# Patient Record
Sex: FEMALE | Race: OTHER | HISPANIC OR LATINO
[De-identification: names, ages, dates, MRNs, and addresses within clinical notes are randomized per-mention and may not be internally consistent; named-entity substitution may affect disease eponyms.]

---

## 2022-06-16 ENCOUNTER — APPOINTMENT (OUTPATIENT)
Dept: OBGYN | Facility: CLINIC | Age: 32
End: 2022-06-16

## 2022-06-16 ENCOUNTER — APPOINTMENT (OUTPATIENT)
Dept: ANTEPARTUM | Facility: CLINIC | Age: 32
End: 2022-06-16

## 2022-06-16 VITALS
SYSTOLIC BLOOD PRESSURE: 130 MMHG | DIASTOLIC BLOOD PRESSURE: 77 MMHG | WEIGHT: 183 LBS | HEIGHT: 63 IN | BODY MASS INDEX: 32.43 KG/M2

## 2022-06-16 PROCEDURE — ZZZZZ: CPT

## 2022-06-16 NOTE — HISTORY OF PRESENT ILLNESS
[FreeTextEntry1] : Patient is a 31 year old who presents as transfer of care at 28 weeks pregnant. She states she didn’t like the way she was being treated at [facility]. She was also told that she has a low lying placenta, she got MRI for this. On unofficial sono done today no placenta previa was noted. \par \par OBHx: C/S x4

## 2022-06-28 ENCOUNTER — LABORATORY RESULT (OUTPATIENT)
Age: 32
End: 2022-06-28

## 2022-06-28 ENCOUNTER — APPOINTMENT (OUTPATIENT)
Dept: ANTEPARTUM | Facility: CLINIC | Age: 32
End: 2022-06-28

## 2022-06-28 ENCOUNTER — APPOINTMENT (OUTPATIENT)
Dept: OBGYN | Facility: CLINIC | Age: 32
End: 2022-06-28

## 2022-06-28 VITALS
WEIGHT: 189 LBS | SYSTOLIC BLOOD PRESSURE: 112 MMHG | HEIGHT: 63 IN | BODY MASS INDEX: 33.49 KG/M2 | DIASTOLIC BLOOD PRESSURE: 74 MMHG

## 2022-06-28 PROCEDURE — 99213 OFFICE O/P EST LOW 20 MIN: CPT | Mod: 25

## 2022-06-28 PROCEDURE — 76811 OB US DETAILED SNGL FETUS: CPT

## 2022-06-28 PROCEDURE — 90471 IMMUNIZATION ADMIN: CPT

## 2022-06-28 PROCEDURE — 90715 TDAP VACCINE 7 YRS/> IM: CPT

## 2022-06-28 PROCEDURE — 76817 TRANSVAGINAL US OBSTETRIC: CPT

## 2022-06-29 LAB
APPEARANCE: ABNORMAL
BILIRUBIN URINE: NEGATIVE
BLOOD URINE: NEGATIVE
COLOR: NORMAL
GLUCOSE QUALITATIVE U: NEGATIVE
KETONES URINE: NEGATIVE
LEUKOCYTE ESTERASE URINE: NEGATIVE
NITRITE URINE: NEGATIVE
PH URINE: 7.5
PROTEIN URINE: NORMAL
SPECIFIC GRAVITY URINE: 1.02
UROBILINOGEN URINE: NORMAL

## 2022-07-12 ENCOUNTER — APPOINTMENT (OUTPATIENT)
Dept: OBGYN | Facility: CLINIC | Age: 32
End: 2022-07-12

## 2022-07-12 VITALS
HEIGHT: 63 IN | WEIGHT: 193 LBS | DIASTOLIC BLOOD PRESSURE: 68 MMHG | SYSTOLIC BLOOD PRESSURE: 111 MMHG | BODY MASS INDEX: 34.2 KG/M2

## 2022-07-12 PROCEDURE — 0502F SUBSEQUENT PRENATAL CARE: CPT

## 2022-07-26 ENCOUNTER — APPOINTMENT (OUTPATIENT)
Dept: OBGYN | Facility: CLINIC | Age: 32
End: 2022-07-26

## 2022-07-26 VITALS
WEIGHT: 196 LBS | HEIGHT: 63 IN | SYSTOLIC BLOOD PRESSURE: 111 MMHG | DIASTOLIC BLOOD PRESSURE: 74 MMHG | BODY MASS INDEX: 34.73 KG/M2

## 2022-07-26 PROCEDURE — 0502F SUBSEQUENT PRENATAL CARE: CPT

## 2022-07-27 LAB
APPEARANCE: CLEAR
BACTERIA: NEGATIVE
BILIRUBIN URINE: NEGATIVE
BLOOD URINE: NEGATIVE
C TRACH RRNA SPEC QL NAA+PROBE: NOT DETECTED
COLOR: YELLOW
GLUCOSE QUALITATIVE U: NEGATIVE
HYALINE CASTS: 2 /LPF
KETONES URINE: NEGATIVE
LEUKOCYTE ESTERASE URINE: NEGATIVE
MICROSCOPIC-UA: NORMAL
N GONORRHOEA RRNA SPEC QL NAA+PROBE: NOT DETECTED
NITRITE URINE: NEGATIVE
PH URINE: 6.5
PROTEIN URINE: NORMAL
RED BLOOD CELLS URINE: 3 /HPF
SOURCE AMPLIFICATION: NORMAL
SPECIFIC GRAVITY URINE: 1.03
SQUAMOUS EPITHELIAL CELLS: 5 /HPF
UROBILINOGEN URINE: NORMAL
WHITE BLOOD CELLS URINE: 2 /HPF

## 2022-07-28 LAB
BACTERIA UR CULT: NORMAL
CANDIDA VAG CYTO: NOT DETECTED
G VAGINALIS+PREV SP MTYP VAG QL MICRO: DETECTED
T VAGINALIS VAG QL WET PREP: NOT DETECTED

## 2022-08-09 ENCOUNTER — APPOINTMENT (OUTPATIENT)
Dept: OBGYN | Facility: CLINIC | Age: 32
End: 2022-08-09

## 2022-08-09 VITALS — SYSTOLIC BLOOD PRESSURE: 116 MMHG | BODY MASS INDEX: 35.25 KG/M2 | WEIGHT: 199 LBS | DIASTOLIC BLOOD PRESSURE: 75 MMHG

## 2022-08-09 PROCEDURE — 0502F SUBSEQUENT PRENATAL CARE: CPT

## 2022-08-16 ENCOUNTER — APPOINTMENT (OUTPATIENT)
Dept: OBGYN | Facility: CLINIC | Age: 32
End: 2022-08-16

## 2022-08-16 VITALS — DIASTOLIC BLOOD PRESSURE: 75 MMHG | BODY MASS INDEX: 34.9 KG/M2 | SYSTOLIC BLOOD PRESSURE: 111 MMHG | WEIGHT: 197 LBS

## 2022-08-16 PROCEDURE — 36415 COLL VENOUS BLD VENIPUNCTURE: CPT

## 2022-08-16 PROCEDURE — 0502F SUBSEQUENT PRENATAL CARE: CPT

## 2022-08-17 ENCOUNTER — TRANSCRIPTION ENCOUNTER (OUTPATIENT)
Age: 32
End: 2022-08-17

## 2022-08-17 ENCOUNTER — NON-APPOINTMENT (OUTPATIENT)
Age: 32
End: 2022-08-17

## 2022-08-17 LAB — HIV1+2 AB SPEC QL IA.RAPID: NONREACTIVE

## 2022-08-18 ENCOUNTER — OUTPATIENT (OUTPATIENT)
Dept: OUTPATIENT SERVICES | Facility: HOSPITAL | Age: 32
LOS: 1 days | End: 2022-08-18

## 2022-08-18 VITALS
DIASTOLIC BLOOD PRESSURE: 76 MMHG | OXYGEN SATURATION: 98 % | HEART RATE: 92 BPM | TEMPERATURE: 98 F | RESPIRATION RATE: 17 BRPM | SYSTOLIC BLOOD PRESSURE: 117 MMHG | HEIGHT: 64 IN | WEIGHT: 197.09 LBS

## 2022-08-18 DIAGNOSIS — Z98.891 HISTORY OF UTERINE SCAR FROM PREVIOUS SURGERY: ICD-10-CM

## 2022-08-18 DIAGNOSIS — Z98.891 HISTORY OF UTERINE SCAR FROM PREVIOUS SURGERY: Chronic | ICD-10-CM

## 2022-08-18 DIAGNOSIS — O34.211 MATERNAL CARE FOR LOW TRANSVERSE SCAR FROM PREVIOUS CESAREAN DELIVERY: ICD-10-CM

## 2022-08-18 DIAGNOSIS — Z90.49 ACQUIRED ABSENCE OF OTHER SPECIFIED PARTS OF DIGESTIVE TRACT: Chronic | ICD-10-CM

## 2022-08-18 DIAGNOSIS — Z98.890 OTHER SPECIFIED POSTPROCEDURAL STATES: Chronic | ICD-10-CM

## 2022-08-18 LAB
APPEARANCE UR: CLEAR — SIGNIFICANT CHANGE UP
BILIRUB UR-MCNC: NEGATIVE — SIGNIFICANT CHANGE UP
BLD GP AB SCN SERPL QL: NEGATIVE — SIGNIFICANT CHANGE UP
COLOR SPEC: SIGNIFICANT CHANGE UP
DIFF PNL FLD: NEGATIVE — SIGNIFICANT CHANGE UP
GLUCOSE UR QL: NEGATIVE — SIGNIFICANT CHANGE UP
HCT VFR BLD CALC: 39.5 % — SIGNIFICANT CHANGE UP (ref 34.5–45)
HGB BLD-MCNC: 12.9 G/DL — SIGNIFICANT CHANGE UP (ref 11.5–15.5)
KETONES UR-MCNC: NEGATIVE — SIGNIFICANT CHANGE UP
LEUKOCYTE ESTERASE UR-ACNC: NEGATIVE — SIGNIFICANT CHANGE UP
MCHC RBC-ENTMCNC: 30.9 PG — SIGNIFICANT CHANGE UP (ref 27–34)
MCHC RBC-ENTMCNC: 32.7 GM/DL — SIGNIFICANT CHANGE UP (ref 32–36)
MCV RBC AUTO: 94.7 FL — SIGNIFICANT CHANGE UP (ref 80–100)
NITRITE UR-MCNC: NEGATIVE — SIGNIFICANT CHANGE UP
NRBC # BLD: 0 /100 WBCS — SIGNIFICANT CHANGE UP (ref 0–0)
NRBC # FLD: 0 K/UL — SIGNIFICANT CHANGE UP (ref 0–0)
PH UR: 7 — SIGNIFICANT CHANGE UP (ref 5–8)
PLATELET # BLD AUTO: 181 K/UL — SIGNIFICANT CHANGE UP (ref 150–400)
PROT UR-MCNC: NEGATIVE — SIGNIFICANT CHANGE UP
RBC # BLD: 4.17 M/UL — SIGNIFICANT CHANGE UP (ref 3.8–5.2)
RBC # FLD: 13.8 % — SIGNIFICANT CHANGE UP (ref 10.3–14.5)
RH IG SCN BLD-IMP: POSITIVE — SIGNIFICANT CHANGE UP
SP GR SPEC: 1.01 — SIGNIFICANT CHANGE UP (ref 1.01–1.05)
UROBILINOGEN FLD QL: SIGNIFICANT CHANGE UP
WBC # BLD: 9.8 K/UL — SIGNIFICANT CHANGE UP (ref 3.8–10.5)
WBC # FLD AUTO: 9.8 K/UL — SIGNIFICANT CHANGE UP (ref 3.8–10.5)

## 2022-08-18 RX ORDER — SODIUM CHLORIDE 9 MG/ML
1000 INJECTION, SOLUTION INTRAVENOUS
Refills: 0 | Status: DISCONTINUED | OUTPATIENT
Start: 2022-09-07 | End: 2022-09-07

## 2022-08-18 RX ORDER — SODIUM CHLORIDE 9 MG/ML
1000 INJECTION, SOLUTION INTRAVENOUS ONCE
Refills: 0 | Status: COMPLETED | OUTPATIENT
Start: 2022-09-07 | End: 2022-09-07

## 2022-08-18 NOTE — OB PST NOTE - PROBLEM SELECTOR PLAN 1
Repeat  22    CBC UA T&S    Preop instructions and antibacterial soap given and explained via  (verbal and written), with teach back.

## 2022-08-18 NOTE — OB PST NOTE - NSANTHTIREDRD_ENT_A_CORE
Health Maintenance Due   Topic Date Due   • Influenza Vaccine (1) 09/01/2021       Patient is due for the topics as listed above and wishes to proceed with them.          No

## 2022-08-18 NOTE — OB PST NOTE - FALL HARM RISK - UNIVERSAL INTERVENTIONS
Bed in lowest position, wheels locked, appropriate side rails in place/Call bell, personal items and telephone in reach/Instruct patient to call for assistance before getting out of bed or chair/Non-slip footwear when patient is out of bed/[place] to call system/Physically safe environment - no spills, clutter or unnecessary equipment/Purposeful Proactive Rounding/Room/bathroom lighting operational, light cord in reach

## 2022-08-18 NOTE — OB PST NOTE - ASSESSMENT
30 y/o female  (3) miscarriages (2) abortions.  Pt reports c-sections x4, the first performed as baby would not descend.  Now scheduled for Repeat .

## 2022-08-18 NOTE — OB PST NOTE - NSANTHOSAYNRD_GEN_A_CORE
No. MARIVEL screening performed.  STOP BANG Legend: 0-2 = LOW Risk; 3-4 = INTERMEDIATE Risk; 5-8 = HIGH Risk

## 2022-08-18 NOTE — OB PST NOTE - ANESTHESIA, PREVIOUS REACTION, PROFILE
Pt reports she had anesthesia in spine for appendectomy  in [place] and  had difficulty standing straight up after discharge from hospital.  Pt reports she returned to hospital  and received injection in her spine, and symptom resolved.  pt reports with her last  she received the same  anesthesia and it took a long time to start being able to move (approx 3-4 hours after)

## 2022-08-18 NOTE — OB PST NOTE - NSICDXPASTSURGICALHX_GEN_ALL_CORE_FT
PAST SURGICAL HISTORY:  H/O  section x4, most recenlty 2017     PAST SURGICAL HISTORY:  H/O  section x4, most recenlty 2017    History of abdominoplasty  "tummy tuck"    History of appendectomy 2006

## 2022-08-18 NOTE — OB PST NOTE - HISTORY OF PRESENT ILLNESS
30 y/o female  (3) miscarriages (2) abortions.  Pt reports c-sections x4, the first performed as baby would not descend.  Now scheduled for Repeat .     pt reports she feels baby moving as usual today 32 y/o female  (3) miscarriages (2) abortions.  Pt reports c-sections x4, the first performed as baby would not descend.  Now scheduled for Repeat .     Pt reports she feels baby moving today as usual

## 2022-08-19 ENCOUNTER — NON-APPOINTMENT (OUTPATIENT)
Age: 32
End: 2022-08-19

## 2022-08-21 LAB — B-HEM STREP SPEC QL CULT: NORMAL

## 2022-08-25 ENCOUNTER — APPOINTMENT (OUTPATIENT)
Dept: OBGYN | Facility: CLINIC | Age: 32
End: 2022-08-25

## 2022-08-25 VITALS — WEIGHT: 197 LBS | DIASTOLIC BLOOD PRESSURE: 74 MMHG | BODY MASS INDEX: 34.9 KG/M2 | SYSTOLIC BLOOD PRESSURE: 112 MMHG

## 2022-08-25 PROCEDURE — 0502F SUBSEQUENT PRENATAL CARE: CPT

## 2022-09-01 ENCOUNTER — APPOINTMENT (OUTPATIENT)
Dept: OBGYN | Facility: CLINIC | Age: 32
End: 2022-09-01

## 2022-09-01 PROCEDURE — 0502F SUBSEQUENT PRENATAL CARE: CPT

## 2022-09-04 ENCOUNTER — NON-APPOINTMENT (OUTPATIENT)
Age: 32
End: 2022-09-04

## 2022-09-06 ENCOUNTER — TRANSCRIPTION ENCOUNTER (OUTPATIENT)
Age: 32
End: 2022-09-06

## 2022-09-07 ENCOUNTER — RESULT REVIEW (OUTPATIENT)
Age: 32
End: 2022-09-07

## 2022-09-07 ENCOUNTER — INPATIENT (INPATIENT)
Facility: HOSPITAL | Age: 32
LOS: 1 days | Discharge: HOME | End: 2022-09-09
Attending: OBSTETRICS & GYNECOLOGY | Admitting: OBSTETRICS & GYNECOLOGY

## 2022-09-07 VITALS
HEIGHT: 63 IN | TEMPERATURE: 100 F | WEIGHT: 203.93 LBS | DIASTOLIC BLOOD PRESSURE: 81 MMHG | SYSTOLIC BLOOD PRESSURE: 130 MMHG | HEART RATE: 117 BPM | RESPIRATION RATE: 16 BRPM

## 2022-09-07 DIAGNOSIS — Z98.890 OTHER SPECIFIED POSTPROCEDURAL STATES: Chronic | ICD-10-CM

## 2022-09-07 DIAGNOSIS — Z90.49 ACQUIRED ABSENCE OF OTHER SPECIFIED PARTS OF DIGESTIVE TRACT: Chronic | ICD-10-CM

## 2022-09-07 DIAGNOSIS — Z98.891 HISTORY OF UTERINE SCAR FROM PREVIOUS SURGERY: Chronic | ICD-10-CM

## 2022-09-07 LAB
BASOPHILS # BLD AUTO: 0.02 K/UL — SIGNIFICANT CHANGE UP (ref 0–0.2)
BASOPHILS NFR BLD AUTO: 0.2 % — SIGNIFICANT CHANGE UP (ref 0–2)
BLD GP AB SCN SERPL QL: NEGATIVE — SIGNIFICANT CHANGE UP
COVID-19 SPIKE DOMAIN AB INTERP: POSITIVE
COVID-19 SPIKE DOMAIN ANTIBODY RESULT: >250 U/ML — HIGH
EOSINOPHIL # BLD AUTO: 0.05 K/UL — SIGNIFICANT CHANGE UP (ref 0–0.5)
EOSINOPHIL NFR BLD AUTO: 0.5 % — SIGNIFICANT CHANGE UP (ref 0–6)
HBV SURFACE AG SERPL QL IA: SIGNIFICANT CHANGE UP
HCT VFR BLD CALC: 41 % — SIGNIFICANT CHANGE UP (ref 34.5–45)
HGB BLD-MCNC: 14.1 G/DL — SIGNIFICANT CHANGE UP (ref 11.5–15.5)
HIV 1+2 AB+HIV1 P24 AG SERPL QL IA: SIGNIFICANT CHANGE UP
IANC: 7.47 K/UL — HIGH (ref 1.8–7.4)
IMM GRANULOCYTES NFR BLD AUTO: 0.4 % — SIGNIFICANT CHANGE UP (ref 0–1.5)
LYMPHOCYTES # BLD AUTO: 2.07 K/UL — SIGNIFICANT CHANGE UP (ref 1–3.3)
LYMPHOCYTES # BLD AUTO: 20.5 % — SIGNIFICANT CHANGE UP (ref 13–44)
MCHC RBC-ENTMCNC: 31.9 PG — SIGNIFICANT CHANGE UP (ref 27–34)
MCHC RBC-ENTMCNC: 34.4 GM/DL — SIGNIFICANT CHANGE UP (ref 32–36)
MCV RBC AUTO: 92.8 FL — SIGNIFICANT CHANGE UP (ref 80–100)
MONOCYTES # BLD AUTO: 0.44 K/UL — SIGNIFICANT CHANGE UP (ref 0–0.9)
MONOCYTES NFR BLD AUTO: 4.4 % — SIGNIFICANT CHANGE UP (ref 2–14)
NEUTROPHILS # BLD AUTO: 7.47 K/UL — HIGH (ref 1.8–7.4)
NEUTROPHILS NFR BLD AUTO: 74 % — SIGNIFICANT CHANGE UP (ref 43–77)
NRBC # BLD: 0 /100 WBCS — SIGNIFICANT CHANGE UP (ref 0–0)
NRBC # FLD: 0 K/UL — SIGNIFICANT CHANGE UP (ref 0–0)
PLATELET # BLD AUTO: 169 K/UL — SIGNIFICANT CHANGE UP (ref 150–400)
RBC # BLD: 4.42 M/UL — SIGNIFICANT CHANGE UP (ref 3.8–5.2)
RBC # FLD: 13.6 % — SIGNIFICANT CHANGE UP (ref 10.3–14.5)
RH IG SCN BLD-IMP: POSITIVE — SIGNIFICANT CHANGE UP
RUBV IGG SER-ACNC: 10.1 INDEX — SIGNIFICANT CHANGE UP
RUBV IGG SER-IMP: POSITIVE — SIGNIFICANT CHANGE UP
SARS-COV-2 IGG+IGM SERPL QL IA: >250 U/ML — HIGH
SARS-COV-2 IGG+IGM SERPL QL IA: POSITIVE
SARS-COV-2 RNA SPEC QL NAA+PROBE: SIGNIFICANT CHANGE UP
T PALLIDUM AB TITR SER: NEGATIVE — SIGNIFICANT CHANGE UP
WBC # BLD: 10.09 K/UL — SIGNIFICANT CHANGE UP (ref 3.8–10.5)
WBC # FLD AUTO: 10.09 K/UL — SIGNIFICANT CHANGE UP (ref 3.8–10.5)

## 2022-09-07 PROCEDURE — 88302 TISSUE EXAM BY PATHOLOGIST: CPT | Mod: 26

## 2022-09-07 PROCEDURE — 59515 CESAREAN DELIVERY: CPT | Mod: U9

## 2022-09-07 PROCEDURE — 58611 LIGATE OVIDUCT(S) ADD-ON: CPT

## 2022-09-07 RX ORDER — NALBUPHINE HYDROCHLORIDE 10 MG/ML
2.5 INJECTION, SOLUTION INTRAMUSCULAR; INTRAVENOUS; SUBCUTANEOUS EVERY 6 HOURS
Refills: 0 | Status: DISCONTINUED | OUTPATIENT
Start: 2022-09-07 | End: 2022-09-09

## 2022-09-07 RX ORDER — SODIUM CHLORIDE 9 MG/ML
1000 INJECTION, SOLUTION INTRAVENOUS
Refills: 0 | Status: DISCONTINUED | OUTPATIENT
Start: 2022-09-07 | End: 2022-09-07

## 2022-09-07 RX ORDER — CITRIC ACID/SODIUM CITRATE 300-500 MG
30 SOLUTION, ORAL ORAL ONCE
Refills: 0 | Status: COMPLETED | OUTPATIENT
Start: 2022-09-07 | End: 2022-09-07

## 2022-09-07 RX ORDER — OXYTOCIN 10 UNIT/ML
333.33 VIAL (ML) INJECTION
Qty: 20 | Refills: 0 | Status: DISCONTINUED | OUTPATIENT
Start: 2022-09-07 | End: 2022-09-07

## 2022-09-07 RX ORDER — NALOXONE HYDROCHLORIDE 4 MG/.1ML
0.1 SPRAY NASAL
Refills: 0 | Status: DISCONTINUED | OUTPATIENT
Start: 2022-09-07 | End: 2022-09-09

## 2022-09-07 RX ORDER — OXYCODONE HYDROCHLORIDE 5 MG/1
5 TABLET ORAL
Refills: 0 | Status: DISCONTINUED | OUTPATIENT
Start: 2022-09-07 | End: 2022-09-09

## 2022-09-07 RX ORDER — LANOLIN
1 OINTMENT (GRAM) TOPICAL EVERY 6 HOURS
Refills: 0 | Status: DISCONTINUED | OUTPATIENT
Start: 2022-09-07 | End: 2022-09-09

## 2022-09-07 RX ORDER — SODIUM CHLORIDE 9 MG/ML
1000 INJECTION, SOLUTION INTRAVENOUS ONCE
Refills: 0 | Status: DISCONTINUED | OUTPATIENT
Start: 2022-09-07 | End: 2022-09-07

## 2022-09-07 RX ORDER — DIPHENHYDRAMINE HCL 50 MG
25 CAPSULE ORAL EVERY 6 HOURS
Refills: 0 | Status: DISCONTINUED | OUTPATIENT
Start: 2022-09-07 | End: 2022-09-09

## 2022-09-07 RX ORDER — DEXAMETHASONE 0.5 MG/5ML
4 ELIXIR ORAL EVERY 6 HOURS
Refills: 0 | Status: DISCONTINUED | OUTPATIENT
Start: 2022-09-07 | End: 2022-09-09

## 2022-09-07 RX ORDER — TETANUS TOXOID, REDUCED DIPHTHERIA TOXOID AND ACELLULAR PERTUSSIS VACCINE, ADSORBED 5; 2.5; 8; 8; 2.5 [IU]/.5ML; [IU]/.5ML; UG/.5ML; UG/.5ML; UG/.5ML
0.5 SUSPENSION INTRAMUSCULAR ONCE
Refills: 0 | Status: DISCONTINUED | OUTPATIENT
Start: 2022-09-07 | End: 2022-09-09

## 2022-09-07 RX ORDER — SODIUM CHLORIDE 9 MG/ML
500 INJECTION, SOLUTION INTRAVENOUS ONCE
Refills: 0 | Status: DISCONTINUED | OUTPATIENT
Start: 2022-09-07 | End: 2022-09-07

## 2022-09-07 RX ORDER — HEPARIN SODIUM 5000 [USP'U]/ML
5000 INJECTION INTRAVENOUS; SUBCUTANEOUS EVERY 12 HOURS
Refills: 0 | Status: DISCONTINUED | OUTPATIENT
Start: 2022-09-07 | End: 2022-09-09

## 2022-09-07 RX ORDER — SIMETHICONE 80 MG/1
80 TABLET, CHEWABLE ORAL EVERY 4 HOURS
Refills: 0 | Status: DISCONTINUED | OUTPATIENT
Start: 2022-09-07 | End: 2022-09-09

## 2022-09-07 RX ORDER — FAMOTIDINE 10 MG/ML
20 INJECTION INTRAVENOUS ONCE
Refills: 0 | Status: COMPLETED | OUTPATIENT
Start: 2022-09-07 | End: 2022-09-07

## 2022-09-07 RX ORDER — MAGNESIUM HYDROXIDE 400 MG/1
30 TABLET, CHEWABLE ORAL
Refills: 0 | Status: DISCONTINUED | OUTPATIENT
Start: 2022-09-07 | End: 2022-09-09

## 2022-09-07 RX ORDER — IBUPROFEN 200 MG
600 TABLET ORAL EVERY 6 HOURS
Refills: 0 | Status: COMPLETED | OUTPATIENT
Start: 2022-09-07 | End: 2023-08-06

## 2022-09-07 RX ORDER — ACETAMINOPHEN 500 MG
975 TABLET ORAL
Refills: 0 | Status: DISCONTINUED | OUTPATIENT
Start: 2022-09-07 | End: 2022-09-09

## 2022-09-07 RX ORDER — ONDANSETRON 8 MG/1
4 TABLET, FILM COATED ORAL EVERY 6 HOURS
Refills: 0 | Status: DISCONTINUED | OUTPATIENT
Start: 2022-09-07 | End: 2022-09-09

## 2022-09-07 RX ORDER — KETOROLAC TROMETHAMINE 30 MG/ML
30 SYRINGE (ML) INJECTION EVERY 6 HOURS
Refills: 0 | Status: DISCONTINUED | OUTPATIENT
Start: 2022-09-07 | End: 2022-09-08

## 2022-09-07 RX ORDER — OXYCODONE HYDROCHLORIDE 5 MG/1
5 TABLET ORAL ONCE
Refills: 0 | Status: DISCONTINUED | OUTPATIENT
Start: 2022-09-07 | End: 2022-09-09

## 2022-09-07 RX ADMIN — HEPARIN SODIUM 5000 UNIT(S): 5000 INJECTION INTRAVENOUS; SUBCUTANEOUS at 18:18

## 2022-09-07 RX ADMIN — Medication 30 MILLIGRAM(S): at 23:56

## 2022-09-07 RX ADMIN — Medication 30 MILLIGRAM(S): at 18:18

## 2022-09-07 RX ADMIN — Medication 30 MILLIGRAM(S): at 23:26

## 2022-09-07 RX ADMIN — ONDANSETRON 4 MILLIGRAM(S): 8 TABLET, FILM COATED ORAL at 18:18

## 2022-09-07 RX ADMIN — Medication 30 MILLIGRAM(S): at 18:55

## 2022-09-07 RX ADMIN — SODIUM CHLORIDE 125 MILLILITER(S): 9 INJECTION, SOLUTION INTRAVENOUS at 11:21

## 2022-09-07 RX ADMIN — FAMOTIDINE 20 MILLIGRAM(S): 10 INJECTION INTRAVENOUS at 11:11

## 2022-09-07 RX ADMIN — SODIUM CHLORIDE 2000 MILLILITER(S): 9 INJECTION, SOLUTION INTRAVENOUS at 13:43

## 2022-09-07 RX ADMIN — Medication 30 MILLILITER(S): at 11:11

## 2022-09-07 NOTE — OB RN PATIENT PROFILE - FALL HARM RISK - UNIVERSAL INTERVENTIONS
Bed in lowest position, wheels locked, appropriate side rails in place/Call bell, personal items and telephone in reach/Instruct patient to call for assistance before getting out of bed or chair/Non-slip footwear when patient is out of bed/[place] to call system/Physically safe environment - no spills, clutter or unnecessary equipment/Purposeful Proactive Rounding/Room/bathroom lighting operational, light cord in reach

## 2022-09-07 NOTE — OB RN PATIENT PROFILE - NSICDXPASTMEDICALHX_GEN_ALL_CORE_FT
PAST MEDICAL HISTORY:  H/O sinus tachycardia During Pregnancy, Seen by Cardiologist    History of  section x4    History of chlamydia infection During 1st trimester, treated     (normal spontaneous vaginal delivery)      Lazy S Complex Repair Preamble Text (Leave Blank If You Do Not Want): Extensive wide undermining was performed.

## 2022-09-07 NOTE — OB PROVIDER H&P - NSICDXPASTSURGICALHX_GEN_ALL_CORE_FT
PAST SURGICAL HISTORY:  H/O  section x4, most recenlty 2017    History of abdominoplasty  "tummy tuck"    History of appendectomy 2006

## 2022-09-07 NOTE — OB PROVIDER H&P - NSHPPHYSICALEXAM_GEN_ALL_CORE
T(C): --  HR: 111 (09-07-22 @ 09:25) (111 - 117)  BP: 133/82 (09-07-22 @ 09:25) (130/81 - 133/82)      A&Ox3  Heart: RRR, S1&S2, no S3  Lungs: Clear bilateral to auscultation, good inspiratory /expiratory effort              no rhonchi, no rales  Abd: soft, Gravid, TOCO in place           +transverse hypertrophied scar   Bedside TLTAS: Anterior placenta/Vx  EFM:  xxxxx bpm/moderate variabilty/+accelerations/no decelerations/CAT 1  TOCO:  no UC  Ext:  FROM /bilateral  1+ LE edema   Neuro: grossly intact T(C): --  HR: 111 (09-07-22 @ 09:25) (111 - 117)  BP: 133/82 (09-07-22 @ 09:25) (130/81 - 133/82)      A&Ox3  Heart: RRR, S1&S2, no S3  Lungs: Clear bilateral to auscultation, good inspiratory /expiratory effort              no rhonchi, no rales  Abd: soft, Gravid, TOCO in place           +transverse hypertrophied scar   Bedside TLTAS: Anterior placenta/Vx  EFM:  145 bpm/moderate variabilty/+accelerations/no decelerations/CAT 1  TOCO:  no UC's  Ext:  FROM /bilateral  1+ LE edema   Neuro: grossly intact

## 2022-09-07 NOTE — OB PROVIDER DELIVERY SUMMARY - NSPROVIDERDELIVERYNOTE_OBGYN_ALL_OB_FT
Repeat mid transfer  section done with bilateral salpingectomy  Live male infant delivered in good condition.  Placenta delivered uneventfully  No increase in risk of bleeding infection or DVT Repeat mid transfer  section done with bilateral salpingectomy  Live male infant delivered in good condition.  Placenta delivered uneventfully  No increase in risk of bleeding infection or DVT      IVF 3100      Dictation #: [id number]

## 2022-09-07 NOTE — OB PROVIDER H&P - ASSESSMENT
Admit to SONIA Mcgovern MD-Service  Dx: scheduled rLTCS and BTL  NPO  routine labs  EFM/TOCO  T&X- 2u  Bedside TLTAS  anesthesia consult  Britt Askew PAC, [facility]  09-07-22 @ 10:14

## 2022-09-07 NOTE — OB RN PATIENT PROFILE - FUNCTIONAL ASSESSMENT - BASIC MOBILITY 6.
4-calculated by average/Not able to assess (calculate score using [facility] averaging method)

## 2022-09-07 NOTE — OB RN DELIVERY SUMMARY - NS_SEPSISRSKCALC_OBGYN_ALL_OB_FT
EOS calculated successfully. EOS Risk Factor: 0.5/1000 live births ([facility] national incidence); GA=39w3d; Temp=99.5; ROM=0.033; GBS='Negative'; Antibiotics='No antibiotics or any antibiotics < 2 hrs prior to birth'

## 2022-09-07 NOTE — OB RN PATIENT PROFILE - ANESTHESIA, PREVIOUS REACTION, PROFILE
Pt reports she had anesthesia in spine for appendectomy  in [place] and  had difficulty standing straight up after discharge from hospital.  Pt reports she returned to hospital  and received injection in her spine, and symptom resolved.  pt reports with her last  she received the same  anesthesia and it took a long time to start being able to move (approx 3-4 hours after)

## 2022-09-07 NOTE — OB RN PATIENT PROFILE - NSICDXPASTSURGICALHX_GEN_ALL_CORE_FT
PAST SURGICAL HISTORY:  H/O  section x4, most recenlty 2017    History of abdominoplasty  "tummy tuck"    History of appendectomy open appendectomy,

## 2022-09-07 NOTE — OB PROVIDER H&P - ANESTHESIA, PREVIOUS REACTION, PROFILE
Pt reports she had anesthesia in spine for appendectomy  in [place] and  had difficulty standing straight up after discharge from hospital.  Pt reports she returned to hospital  and received injection in her spine, and symptom resolved.  pt reports with her last  she received the same  anesthesia and it took a long time to start being able to move (approx 3-4 hours after)

## 2022-09-07 NOTE — OB PROVIDER H&P - NS_OBGYNHISTORY_OBGYN_ALL_OB_FT
OBHx:              2017- rLTCS @ 37wks/placenta previa- female            2014-  rLTCS @ 36wks/PPROM @ 34wk- female- to NICU             2009- rLTCS@36wks/VB- male            10/23/2007- pLTCS@36wks/PTL/arrest- male             2005- NVD @ 39wks- female- 3800gm              eTOP x3              sAB x2- 6wks- D&C

## 2022-09-07 NOTE — OB PROVIDER H&P - NSICDXPASTMEDICALHX_GEN_ALL_CORE_FT
PAST MEDICAL HISTORY:  H/O sinus tachycardia     History of  section     History of chlamydia infection

## 2022-09-07 NOTE — OB PROVIDER H&P - HISTORY OF PRESENT ILLNESS
30yo female  EDC 2022 presents@ 39.3 wks for scheduled rltcs/BTL  +AP course   tachycardia in 1st and 2nd trimester- cardio w/u- neg/ rx'd Metoprolol- never took.   + chlamydia- treated /first trimester.   Recurrent BV- treated w/ vaginal and oral med- last treated 2022,  otherwise unremarkable.   Denies SOB,fever, chills or cough.    Denies exposure to COVID-19, swabbed - results not found.  Denies VB,ROM or UCs today.  +FM

## 2022-09-07 NOTE — OB PROVIDER H&P - NSHPREVIEWOFSYSTEMS_GEN_ALL_CORE
A&Ox3  GENERAL: denies fever, malaise, body aches, COVID status unk- swabbed this am   CARDIOVASCULAR:  denies murmurs or h/o cardio myopathy, denies palpitations, chest pain or LOC  RESPIRATORY: denies cough, wheeze or SOB  HEMATOLOGICAL: denies blood clotting disorder, h/o PE or DVT, h/o Blood Transfusion

## 2022-09-07 NOTE — OB RN PATIENT PROFILE - NS_OBGYNHISTORY_OBGYN_ALL_OB_FT
2005  FT Female ([place])   Primary C/S @ 36week (failure to descend) Male - [place] Post Partum ([place])   Repeat C/S @ 36week Vaginal Bleeding - Female NICU admission ([place])   - Repeat C/S@ PPROM @ 34week, delivered at 36week Female [facility]  2017 - Repeat C/S @ 37week Previa Female, [facility]  SAB x 2, TOP x 3 - D&C x5    Tachycardia during current pregnancy, seen by cardiologist  Chlamydia during current pregnancy, treated during 2nd trimester  Transfer of care at 28weeks from [facility]  Resolved Placenta Previa at 28weeks

## 2022-09-08 LAB
BASOPHILS # BLD AUTO: 0.02 K/UL — SIGNIFICANT CHANGE UP (ref 0–0.2)
BASOPHILS NFR BLD AUTO: 0.1 % — SIGNIFICANT CHANGE UP (ref 0–2)
EOSINOPHIL # BLD AUTO: 0.08 K/UL — SIGNIFICANT CHANGE UP (ref 0–0.5)
EOSINOPHIL NFR BLD AUTO: 0.5 % — SIGNIFICANT CHANGE UP (ref 0–6)
HCT VFR BLD CALC: 32.8 % — LOW (ref 34.5–45)
HGB BLD-MCNC: 11.1 G/DL — LOW (ref 11.5–15.5)
IANC: 12.07 K/UL — HIGH (ref 1.8–7.4)
IMM GRANULOCYTES NFR BLD AUTO: 0.6 % — SIGNIFICANT CHANGE UP (ref 0–1.5)
LYMPHOCYTES # BLD AUTO: 14.1 % — SIGNIFICANT CHANGE UP (ref 13–44)
LYMPHOCYTES # BLD AUTO: 2.11 K/UL — SIGNIFICANT CHANGE UP (ref 1–3.3)
MCHC RBC-ENTMCNC: 31.3 PG — SIGNIFICANT CHANGE UP (ref 27–34)
MCHC RBC-ENTMCNC: 33.8 GM/DL — SIGNIFICANT CHANGE UP (ref 32–36)
MCV RBC AUTO: 92.4 FL — SIGNIFICANT CHANGE UP (ref 80–100)
MONOCYTES # BLD AUTO: 0.58 K/UL — SIGNIFICANT CHANGE UP (ref 0–0.9)
MONOCYTES NFR BLD AUTO: 3.9 % — SIGNIFICANT CHANGE UP (ref 2–14)
NEUTROPHILS # BLD AUTO: 12.07 K/UL — HIGH (ref 1.8–7.4)
NEUTROPHILS NFR BLD AUTO: 80.8 % — HIGH (ref 43–77)
NRBC # BLD: 0 /100 WBCS — SIGNIFICANT CHANGE UP (ref 0–0)
NRBC # FLD: 0 K/UL — SIGNIFICANT CHANGE UP (ref 0–0)
PLATELET # BLD AUTO: 152 K/UL — SIGNIFICANT CHANGE UP (ref 150–400)
RBC # BLD: 3.55 M/UL — LOW (ref 3.8–5.2)
RBC # FLD: 13.5 % — SIGNIFICANT CHANGE UP (ref 10.3–14.5)
WBC # BLD: 14.95 K/UL — HIGH (ref 3.8–10.5)
WBC # FLD AUTO: 14.95 K/UL — HIGH (ref 3.8–10.5)

## 2022-09-08 RX ORDER — IBUPROFEN 200 MG
600 TABLET ORAL EVERY 6 HOURS
Refills: 0 | Status: DISCONTINUED | OUTPATIENT
Start: 2022-09-08 | End: 2022-09-09

## 2022-09-08 RX ADMIN — Medication 975 MILLIGRAM(S): at 21:15

## 2022-09-08 RX ADMIN — Medication 975 MILLIGRAM(S): at 09:35

## 2022-09-08 RX ADMIN — Medication 30 MILLIGRAM(S): at 13:05

## 2022-09-08 RX ADMIN — Medication 30 MILLIGRAM(S): at 12:35

## 2022-09-08 RX ADMIN — HEPARIN SODIUM 5000 UNIT(S): 5000 INJECTION INTRAVENOUS; SUBCUTANEOUS at 05:28

## 2022-09-08 RX ADMIN — Medication 600 MILLIGRAM(S): at 18:40

## 2022-09-08 RX ADMIN — Medication 600 MILLIGRAM(S): at 23:40

## 2022-09-08 RX ADMIN — Medication 30 MILLIGRAM(S): at 05:58

## 2022-09-08 RX ADMIN — Medication 975 MILLIGRAM(S): at 15:50

## 2022-09-08 RX ADMIN — Medication 30 MILLIGRAM(S): at 05:28

## 2022-09-08 RX ADMIN — Medication 600 MILLIGRAM(S): at 18:10

## 2022-09-08 RX ADMIN — Medication 600 MILLIGRAM(S): at 23:10

## 2022-09-08 RX ADMIN — SIMETHICONE 80 MILLIGRAM(S): 80 TABLET, CHEWABLE ORAL at 12:37

## 2022-09-08 RX ADMIN — Medication 975 MILLIGRAM(S): at 15:20

## 2022-09-08 RX ADMIN — HEPARIN SODIUM 5000 UNIT(S): 5000 INJECTION INTRAVENOUS; SUBCUTANEOUS at 18:10

## 2022-09-08 RX ADMIN — Medication 975 MILLIGRAM(S): at 20:45

## 2022-09-08 RX ADMIN — Medication 975 MILLIGRAM(S): at 09:05

## 2022-09-08 NOTE — PROGRESS NOTE ADULT - SUBJECTIVE AND OBJECTIVE BOX
Pain Service Follow-up  Postop Day  1    S/P  C- Section    T(C): 36.5 (09-08-22 @ 06:34), Max: 37.5 (09-07-22 @ 09:22)  HR: 88 (09-08-22 @ 06:34) (55 - 111)  BP: 106/68 (09-08-22 @ 06:34) (96/64 - 133/82)  RR: 18 (09-08-22 @ 06:34) (11 - 19)  SpO2: 97% (09-08-22 @ 06:34) (95% - 100%)  Wt(kg): --      THERAPY:  Spinal Morphine     Sedation Score:	  [X] Alert	      [  ] Drowsy       [  ] Arousable	[  ] Asleep         [  ] Unresponsive    Side Effects:	  [X] None	      [  ] Nausea       [  ] Pruritus        [  ] Weakness   [  ] Numbness        ASSESSMENT/ PLAN   [ X ] Discontinue         [  ] Continue    [ X ] Documentation and Verification of current medications       Satisfactory Post Anesthetic Course

## 2022-09-08 NOTE — LACTATION INITIAL EVALUATION - LACTATION INTERVENTIONS
initiate/review safe skin-to-skin/initiate/review hand expression/post discharge community resources provided/initiate/review breast massage/compression/reviewed importance of monitoring infant diapers, the breastfeeding log, and minimum output each day/reviewed risks of unnecessary formula supplementation/reviewed risks of artificial nipples/reviewed strategies to transition to breastfeeding only/reviewed benefits and recommendations for rooming in/reviewed feeding on demand/by cue at least 8 times a day/recommended follow-up with pediatrician within 24 hours of discharge/reviewed indications of inadequate milk transfer that would require supplementation

## 2022-09-08 NOTE — PROGRESS NOTE ADULT - SUBJECTIVE AND OBJECTIVE BOX
OB Progress Note:  Delivery, POD#1    S: Her pain is well controlled. She is tolerating a regular diet and passing flatus. Denies N/V. Denies CP/SOB/lightheadedness/dizziness. Endorses light vaginal bleeding, less than one pad per hour. She is ambulating without difficulty. Voiding spontaneously.     O:   Vital Signs Last 24 Hrs  T(C): 36.6 (08 Sep 2022 02:00), Max: 37.5 (07 Sep 2022 09:22)  T(F): 97.8 (08 Sep 2022 02:00), Max: 99.5 (07 Sep 2022 09:22)  HR: 62 (08 Sep 2022 02:00) (55 - 111)  BP: 97/56 (08 Sep 2022 02:00) (96/64 - 133/82)  BP(mean): 77 (07 Sep 2022 17:00) (63 - 82)  RR: 16 (08 Sep 2022 02:00) (11 - 19)  SpO2: 98% (08 Sep 2022 02:00) (95% - 100%)    Parameters below as of 08 Sep 2022 02:00  Patient On (Oxygen Delivery Method): room air        PE:  General: NAD  Heart: extremities well-perfused  Lungs: breathing comfortably  Abdomen: Mildly distended, appropriately tender, incision c/d/i.  Extremities: No erythema, no pitting edema    Labs:  Blood type: A Positive  Rubella IgG: RPR: Negative                          14.1   10.09 >-----------< 169    (  @ 10:07 )             41.0                 OB Progress Note:  Delivery, POD#1    S: Her pain is well controlled. She is tolerating a regular diet but not yet passing flatus. Denies N/V. Denies CP/SOB/lightheadedness/dizziness. Endorses light vaginal bleeding, less than one pad per hour. She is ambulating without difficulty. Voiding spontaneously.     O:   Vital Signs Last 24 Hrs  T(C): 36.6 (08 Sep 2022 02:00), Max: 37.5 (07 Sep 2022 09:22)  T(F): 97.8 (08 Sep 2022 02:00), Max: 99.5 (07 Sep 2022 09:22)  HR: 62 (08 Sep 2022 02:00) (55 - 111)  BP: 97/56 (08 Sep 2022 02:00) (96/64 - 133/82)  BP(mean): 77 (07 Sep 2022 17:00) (63 - 82)  RR: 16 (08 Sep 2022 02:00) (11 - 19)  SpO2: 98% (08 Sep 2022 02:00) (95% - 100%)    Parameters below as of 08 Sep 2022 02:00  Patient On (Oxygen Delivery Method): room air        PE:  General: NAD  Heart: extremities well-perfused  Lungs: breathing comfortably  Abdomen: Mildly distended, appropriately tender, incision c/d/i.  Extremities: No erythema, no pitting edema    Labs:  Blood type: A Positive  Rubella IgG: RPR: Negative                          14.1   10.09 >-----------< 169    (  @ 10:07 )             41.0

## 2022-09-08 NOTE — LACTATION INITIAL EVALUATION - NS_PARA_OBGYN_ALL_OB_NU
Procedure Instructions    Name: Alexx Welch   1960      MRN: [id number]  Surgeon: Jed Shepard MD   Procedure: Heart Catheterization  Date of procedure: 3/25/21      Arrival time: 7:00 AM  Time of your procedure: 9:00AM   Procedure times may change due to cancellations and emergencies. If your arrival time changes, you will be notified.      Hospital/Facility: [facility] ([facility]) [address]. Use main entrance, take elevators to 2nd floor. Turn left off the elevators, turn right at first hallway. Surgery registration desk is located just past the restrooms.      Please review these instructions and call our office with any questions [phone].    PRESURGICAL/PREPROCEDURAL  COVID-19 PATIENT INSTRUCTIONS   Test Appointment Date: 3/22/21   Time: 1:55PM  Location: [address]    It is very important to avoid any potential exposure to COVID-19.  Please follow these  instructions until surgery or procedure has been completed.    ·  Do NOT travel out of home area (city/town)     ·  Avoid outings from home-leave ONLY for essential needs and essential medical appointments.    ·  Immediately report any new symptoms or suspected/known exposures to COVID-19 cases to your surgeon office at [phone]    · Maintain physical distancing (at least 6 feet) at all times both at home and away from home     · Wear a mask while you are outside the house for essential needs and medical appointments, if you have one.     · Wash hands frequently and thoroughly with soap and water (lather at least 20 seconds) or disinfect with alcohol-based hand  before eating, before touching face/mouth/eyes, after touching shared objects or high touch surfaces.     · Regularly clean cell phones, door handles, light switches, faucet and toilet handles, bathrooms, and kitchen surfaces using household disinfectant or hot soapy water.           MEDICATION INSTRUCTIONS       · Please read this section carefully, as some medications may have special instructions.  · STOP TAKING Coumadin (warfarin) 4 days before your procedure.    STOP TAKING:  · Over the counter supplements/vitamins, fish oil and herbs 7 days before your procedure, (you may continue any potassium and iron supplement).  · Over the counter and prescription diet pills 7 days before your procedure.   · Erectile dysfunction medications (Viagra, Cialis, Levitra, etc.) 24 hours before your procedure.     DO NOT TAKE Lisinopril (Prinivil, Zestril, Zestoretic) on the day of your procedure.    Diabetic medication instructions:  On the day of your procedure, DO NOT TAKE the following oral diabetic medication(s): Metformin (Glucophage).  · Follow these instructions for your diabetic medications unless you are instructed differently in your preoperative appointment.    On the morning of your procedure, take any other medications not mentioned above with a small sip of water.       NOTE: If you are on a specific type of medication called an MAO Inhibitor, please discuss with our office. You may need special instructions.  These medications or patches may need to be stopped weeks before your procedure. You may need a consultation with an anesthesiologist.    DIET INSTRUCTIONS      DO NOT eat solid food or drink any fluids after midnight the night before procedure.    DO NOT chew gum, eat mints or candies after midnight the night before procedure.    Drink as much fluids as possible until midnight to prevent dehydration, unless your provider has advised you to limit fluids.      BATHING INSTRUCTIONS     Night before and morning of procedure:  · Bathe or shower and wash your hair using your usual soap and shampoo.    OTHER EDUCATION PROVIDED   • Disclosure of Physician Ownership in [facility] ([facility]), [facility] ([facility])  POSTOP INSTRUCTIONS FOR YOUR PROCEDURE     You should be able to go home  sometime the day of your procedure/surgery, but you will not be able to drive yourself home. A responsible adult must drive you home. If you do not have someone to drive you home, your procedure/surgery may be cancelled. For safety purposes, it is required that you have someone stay with you for 24 hours after surgery.     If needed, a prescription for post-operative medications will be provided before you are discharged.        5

## 2022-09-09 ENCOUNTER — TRANSCRIPTION ENCOUNTER (OUTPATIENT)
Age: 32
End: 2022-09-09

## 2022-09-09 VITALS
DIASTOLIC BLOOD PRESSURE: 66 MMHG | RESPIRATION RATE: 18 BRPM | SYSTOLIC BLOOD PRESSURE: 106 MMHG | HEART RATE: 97 BPM | TEMPERATURE: 98 F | OXYGEN SATURATION: 97 %

## 2022-09-09 RX ORDER — IBUPROFEN 200 MG
1 TABLET ORAL
Qty: 0 | Refills: 0 | DISCHARGE
Start: 2022-09-09

## 2022-09-09 RX ORDER — ACETAMINOPHEN 500 MG
3 TABLET ORAL
Qty: 0 | Refills: 0 | DISCHARGE
Start: 2022-09-09

## 2022-09-09 RX ADMIN — Medication 600 MILLIGRAM(S): at 12:33

## 2022-09-09 RX ADMIN — Medication 975 MILLIGRAM(S): at 02:45

## 2022-09-09 RX ADMIN — Medication 600 MILLIGRAM(S): at 06:19

## 2022-09-09 RX ADMIN — Medication 600 MILLIGRAM(S): at 13:00

## 2022-09-09 RX ADMIN — HEPARIN SODIUM 5000 UNIT(S): 5000 INJECTION INTRAVENOUS; SUBCUTANEOUS at 05:49

## 2022-09-09 RX ADMIN — Medication 600 MILLIGRAM(S): at 05:49

## 2022-09-09 RX ADMIN — Medication 975 MILLIGRAM(S): at 02:15

## 2022-09-09 NOTE — DISCHARGE NOTE OB - MATERIALS PROVIDED
Vaccinations/[facility]  Screening Program/  Immunization Record/Breastfeeding Log/Bottle Feeding Log/Breastfeeding Mother’s Support Group Information/Guide to Postpartum Care/[facility] Hearing Screen Program/Back To Sleep Handout/Shaken Baby Prevention Handout/Breastfeeding Guide and Packet/Birth Certificate Instructions

## 2022-09-09 NOTE — PROGRESS NOTE ADULT - SUBJECTIVE AND OBJECTIVE BOX
S: Patient doing well. No complaints. Minimal lochia. Pain controlled.    O: Vital Signs Last 24 Hrs  T(C): 36.6 (09 Sep 2022 05:40), Max: 36.9 (08 Sep 2022 21:49)  T(F): 97.9 (09 Sep 2022 05:40), Max: 98.4 (08 Sep 2022 21:49)  HR: 57 (09 Sep 2022 05:40) (57 - 101)  BP: 105/55 (09 Sep 2022 05:40) (105/55 - 122/67)  BP(mean): --  RR: 18 (09 Sep 2022 05:40) (18 - 18)  SpO2: 97% (09 Sep 2022 05:40) (97% - 99%)    Parameters below as of 09 Sep 2022 05:40  Patient On (Oxygen Delivery Method): room air        Gen: NAD  Abd: soft, Nontender, Nondistended, fundus firm  Ext: no tendern, mild edema    Labs:                        11.1   14.95 )-----------( 152      ( 08 Sep 2022 06:45 )             32.8       A: 31y POD#2 s/p c/s doing well.    Plan:  Routine postpartum care  Encouraged out of bed  Regular diet

## 2022-09-09 NOTE — DISCHARGE NOTE OB - PLAN OF CARE
- patient to resume activities over the next 6 to 8 week  - patient to keep scheduled follow up with OBGYN for next week  - patient to notify OBGYN for fever, chills, increased vaginal bleeding, foul ordor discharge from incision site, blood from incision siete

## 2022-09-09 NOTE — DISCHARGE NOTE OB - NS MD DC FALL RISK RISK
For information on Fall & Injury Prevention, visit: [email or web address] OR  [email or web address] OR  https://www.cdc.gov/steadi/patient.html

## 2022-09-09 NOTE — DISCHARGE NOTE OB - PATIENT PORTAL LINK FT
You can access the FollowMyHealth Patient Portal offered by [facility] by registering at the following website: [email or web address]. By joining [facility]’s FollowMyHealth portal, you will also be able to view your health information using other applications (apps) compatible with our system.

## 2022-09-09 NOTE — DISCHARGE NOTE OB - PROVIDER TOKENS
56 yo M former smoker with hx of DM, HTN, CAD s/p 3vCABG 10 years ago, lost to follow up and currently w/o cardiologist, on Plavix daily presenting with intermittent nonexertional 6/10 to 8/10 L sided non-radiating chest discomfort since Sat with at times mild FALK.  No radiating of pain, cough, fever, chills, LE edema or calf pain.  No hx of DVT or PE.  Intermittently compliant with medications. PROVIDER:[TOKEN:[[id number]]]

## 2022-09-09 NOTE — DISCHARGE NOTE OB - CARE PROVIDER_API CALL
Trip Mcgovern)  MaternalFetal Medicine; Obstetrics and Gynecology  [address] [place] [address]  Phone: [phone]  Fax: [phone]  Follow Up Time:

## 2022-09-09 NOTE — DISCHARGE NOTE OB - CARE PLAN
1 Principal Discharge DX:	 delivery delivered  Assessment and plan of treatment:	- patient to resume activities over the next 6 to 8 week  - patient to keep scheduled follow up with OBGYN for next week  - patient to notify OBGYN for fever, chills, increased vaginal bleeding, foul ordor discharge from incision site, blood from incision siete

## 2022-09-09 NOTE — DISCHARGE NOTE OB - MEDICATION SUMMARY - MEDICATIONS TO TAKE
I will START or STAY ON the medications listed below when I get home from the hospital:    acetaminophen 325 mg oral tablet  -- 3 tab(s) by mouth every 6 hours, As Needed  -- Indication: For pain management    ibuprofen 600 mg oral tablet  -- 1 tab(s) by mouth every 6 hours, As Needed  -- Indication: For pain management

## 2022-09-12 PROBLEM — Z86.79 PERSONAL HISTORY OF OTHER DISEASES OF THE CIRCULATORY SYSTEM: Chronic | Status: ACTIVE | Noted: 2022-09-07

## 2022-09-12 PROBLEM — Z98.891 HISTORY OF UTERINE SCAR FROM PREVIOUS SURGERY: Chronic | Status: ACTIVE | Noted: 2022-08-18

## 2022-09-12 PROBLEM — Z86.19 PERSONAL HISTORY OF OTHER INFECTIOUS AND PARASITIC DISEASES: Chronic | Status: ACTIVE | Noted: 2022-09-07

## 2022-09-15 LAB — SURGICAL PATHOLOGY STUDY: SIGNIFICANT CHANGE UP

## 2022-09-28 ENCOUNTER — APPOINTMENT (OUTPATIENT)
Dept: OBGYN | Facility: CLINIC | Age: 32
End: 2022-09-28

## 2022-09-28 VITALS
DIASTOLIC BLOOD PRESSURE: 69 MMHG | HEIGHT: 63 IN | SYSTOLIC BLOOD PRESSURE: 93 MMHG | WEIGHT: 178 LBS | BODY MASS INDEX: 31.54 KG/M2

## 2022-09-28 DIAGNOSIS — Z48.89 ENCOUNTER FOR OTHER SPECIFIED SURGICAL AFTERCARE: ICD-10-CM

## 2022-09-28 DIAGNOSIS — L30.9 DERMATITIS, UNSPECIFIED: ICD-10-CM

## 2022-09-28 PROCEDURE — 0503F POSTPARTUM CARE VISIT: CPT

## 2022-09-28 NOTE — HISTORY OF PRESENT ILLNESS
[Postpartum Follow Up] : postpartum follow up [Complications:___] : no complications [Delivery Date: ___] : on [unfilled] [Repeat C/S] : delivered by  section (repeat) [Male] : Delivery History: baby boy [Wt. ___] : weighing [unfilled] [Breastfeeding] : currently nursing [BF with Difficulty] : nursing without difficulty [None] : No associated symptoms are reported [Clean/Dry/Intact] : clean, dry and intact [Erythema] : not erythematous [Swelling] : not swollen [Dehiscence] : not dehisced [Healed] : healed [Examination Of The Breasts] : breasts are normal [Doing Well] : is doing well [No Sign of Infection] : is showing no signs of infection [Excellent Pain Control] : has excellent pain control [Sutures Removed] : sutures were removed [FreeTextEntry8] : incision check, c/o of hive like rash in the evening  [de-identified] : pruritic/hive rash in the evening  [de-identified] : incision welll approximated without s/s of infection  [de-identified] : incision WNL [de-identified] : Incision wnl, steri- strips removed.  patient showed picture of rash, as she does not have it now. appears to be hive like rash recommended benadryl QHS and zytrec daily for the next 7-14 days. hydrocortisone cream given as all. f/u in 5 weeks for PP visit

## 2022-10-14 ENCOUNTER — APPOINTMENT (OUTPATIENT)
Dept: DERMATOLOGY | Facility: CLINIC | Age: 32
End: 2022-10-14

## 2022-11-02 ENCOUNTER — APPOINTMENT (OUTPATIENT)
Dept: OBGYN | Facility: CLINIC | Age: 32
End: 2022-11-02

## 2022-11-15 ENCOUNTER — APPOINTMENT (OUTPATIENT)
Dept: OBGYN | Facility: CLINIC | Age: 32
End: 2022-11-15

## 2022-11-15 VITALS
SYSTOLIC BLOOD PRESSURE: 117 MMHG | DIASTOLIC BLOOD PRESSURE: 76 MMHG | HEIGHT: 63 IN | BODY MASS INDEX: 31.89 KG/M2 | WEIGHT: 180 LBS

## 2022-11-15 DIAGNOSIS — N89.8 OTHER SPECIFIED NONINFLAMMATORY DISORDERS OF VAGINA: ICD-10-CM

## 2022-11-15 PROCEDURE — 0503F POSTPARTUM CARE VISIT: CPT

## 2022-11-17 ENCOUNTER — NON-APPOINTMENT (OUTPATIENT)
Age: 32
End: 2022-11-17

## 2022-11-17 DIAGNOSIS — B96.89 ACUTE VAGINITIS: ICD-10-CM

## 2022-11-17 DIAGNOSIS — N76.0 ACUTE VAGINITIS: ICD-10-CM

## 2022-11-17 LAB
CANDIDA VAG CYTO: NOT DETECTED
G VAGINALIS+PREV SP MTYP VAG QL MICRO: DETECTED
T VAGINALIS VAG QL WET PREP: NOT DETECTED

## 2022-11-17 NOTE — HISTORY OF PRESENT ILLNESS
[Delivery Date: ___] : on [unfilled] [Male] : Delivery History: baby boy [Wt. ___] : weighing [unfilled] [Breastfeeding] : currently nursing [None] : No associated symptoms are reported [BF with Difficulty] : nursing without difficulty [Clean/Dry/Intact] : clean, dry and intact [Healed] : healed [FreeTextEntry8] : post partum  [de-identified] : breast and bottle [FreeTextEntry1] : 30 y/o F s/p C/S presenting for postpartum visit:\par -incision healing well\par -Patient cleared to resume normal activity (sexual intercourse and exercise)\par -Patient had btl during c/s\par -pt c/o increased vaginal discharge, f/u bv affirm \par -f/u for 3 months for annual\par

## 2022-12-22 DIAGNOSIS — R30.0 DYSURIA: ICD-10-CM

## 2023-02-07 ENCOUNTER — APPOINTMENT (OUTPATIENT)
Dept: OBGYN | Facility: CLINIC | Age: 33
End: 2023-02-07

## 2023-03-01 ENCOUNTER — NON-APPOINTMENT (OUTPATIENT)
Age: 33
End: 2023-03-01

## 2023-03-06 LAB
APPEARANCE: ABNORMAL
BACTERIA: NEGATIVE
BILIRUBIN URINE: NEGATIVE
BLOOD URINE: ABNORMAL
COLOR: YELLOW
GLUCOSE QUALITATIVE U: NEGATIVE
HYALINE CASTS: 4 /LPF
KETONES URINE: NEGATIVE
LEUKOCYTE ESTERASE URINE: ABNORMAL
MICROSCOPIC-UA: NORMAL
NITRITE URINE: NEGATIVE
PH URINE: 6.5
PROTEIN URINE: ABNORMAL
RED BLOOD CELLS URINE: 11 /HPF
SPECIFIC GRAVITY URINE: 1.03
SQUAMOUS EPITHELIAL CELLS: 18 /HPF
UROBILINOGEN URINE: ABNORMAL
WHITE BLOOD CELLS URINE: 12 /HPF

## 2023-03-22 ENCOUNTER — APPOINTMENT (OUTPATIENT)
Dept: INTERNAL MEDICINE | Facility: CLINIC | Age: 33
End: 2023-03-22
Payer: MEDICAID

## 2023-03-22 VITALS
TEMPERATURE: 97.9 F | HEIGHT: 63 IN | OXYGEN SATURATION: 98 % | WEIGHT: 163 LBS | RESPIRATION RATE: 16 BRPM | SYSTOLIC BLOOD PRESSURE: 102 MMHG | DIASTOLIC BLOOD PRESSURE: 67 MMHG | BODY MASS INDEX: 28.88 KG/M2 | HEART RATE: 57 BPM

## 2023-03-22 DIAGNOSIS — Z78.9 OTHER SPECIFIED HEALTH STATUS: ICD-10-CM

## 2023-03-22 DIAGNOSIS — Z00.00 ENCOUNTER FOR GENERAL ADULT MEDICAL EXAMINATION W/OUT ABNORMAL FINDINGS: ICD-10-CM

## 2023-03-22 PROCEDURE — 99385 PREV VISIT NEW AGE 18-39: CPT

## 2023-03-27 ENCOUNTER — NON-APPOINTMENT (OUTPATIENT)
Age: 33
End: 2023-03-27

## 2023-03-27 LAB
ALBUMIN SERPL ELPH-MCNC: 4.4 G/DL
ALP BLD-CCNC: 83 U/L
ALT SERPL-CCNC: 17 U/L
ANION GAP SERPL CALC-SCNC: 13 MMOL/L
AST SERPL-CCNC: 14 U/L
BASOPHILS # BLD AUTO: 0.02 K/UL
BASOPHILS NFR BLD AUTO: 0.3 %
BILIRUB SERPL-MCNC: 0.5 MG/DL
BUN SERPL-MCNC: 17 MG/DL
CALCIUM SERPL-MCNC: 9.7 MG/DL
CHLORIDE SERPL-SCNC: 104 MMOL/L
CO2 SERPL-SCNC: 24 MMOL/L
CREAT SERPL-MCNC: 0.65 MG/DL
EGFR: 118 ML/MIN/1.73M2
EOSINOPHIL # BLD AUTO: 0.23 K/UL
EOSINOPHIL NFR BLD AUTO: 3.3 %
ESTIMATED AVERAGE GLUCOSE: 114 MG/DL
GLUCOSE SERPL-MCNC: 96 MG/DL
HBA1C MFR BLD HPLC: 5.6 %
HCT VFR BLD CALC: 42.4 %
HGB BLD-MCNC: 13.3 G/DL
IMM GRANULOCYTES NFR BLD AUTO: 0.1 %
LYMPHOCYTES # BLD AUTO: 2.21 K/UL
LYMPHOCYTES NFR BLD AUTO: 32.2 %
MAN DIFF?: NORMAL
MCHC RBC-ENTMCNC: 29.7 PG
MCHC RBC-ENTMCNC: 31.4 GM/DL
MCV RBC AUTO: 94.6 FL
MONOCYTES # BLD AUTO: 0.33 K/UL
MONOCYTES NFR BLD AUTO: 4.8 %
NEUTROPHILS # BLD AUTO: 4.07 K/UL
NEUTROPHILS NFR BLD AUTO: 59.3 %
PLATELET # BLD AUTO: 176 K/UL
POTASSIUM SERPL-SCNC: 4.3 MMOL/L
PROT SERPL-MCNC: 7 G/DL
RBC # BLD: 4.48 M/UL
RBC # FLD: 14.6 %
SODIUM SERPL-SCNC: 141 MMOL/L
TSH SERPL-ACNC: 1.02 UIU/ML
WBC # FLD AUTO: 6.87 K/UL

## 2023-03-27 NOTE — HEALTH RISK ASSESSMENT
[Good] : ~his/her~  mood as  good [2 - 4 times a month (2 pts)] : 2-4 times a month (2 points) [Patient/Caregiver not ready to engage] : , patient/caregiver not ready to engage [Never] : Never

## 2023-03-28 NOTE — COUNSELING
[None] : None [Good understanding] : Patient has a good understanding of lifestyle changes and steps needed to achieve self management goal [de-identified] : healthy eating,exercise

## 2023-03-28 NOTE — ASSESSMENT
[FreeTextEntry1] : 31 yo with atypical chest discomfort,palpitations,referred to card,r/o MVP\par labs

## 2023-03-28 NOTE — HISTORY OF PRESENT ILLNESS
[FreeTextEntry1] : HARINDER [de-identified] : 33 yo for routine exam\par c/o palpitations,L.chest discomfort,non exertional,not ass.with other Sx:denies sweats,HA's,sob.\par works at the bar\par

## 2023-03-29 ENCOUNTER — APPOINTMENT (OUTPATIENT)
Dept: OBGYN | Facility: CLINIC | Age: 33
End: 2023-03-29

## 2023-05-16 ENCOUNTER — APPOINTMENT (OUTPATIENT)
Dept: CARDIOLOGY | Facility: CLINIC | Age: 33
End: 2023-05-16
Payer: MEDICAID

## 2023-05-16 ENCOUNTER — NON-APPOINTMENT (OUTPATIENT)
Age: 33
End: 2023-05-16

## 2023-05-16 VITALS
DIASTOLIC BLOOD PRESSURE: 66 MMHG | OXYGEN SATURATION: 99 % | BODY MASS INDEX: 29.41 KG/M2 | HEIGHT: 63 IN | SYSTOLIC BLOOD PRESSURE: 103 MMHG | WEIGHT: 166 LBS | HEART RATE: 64 BPM | TEMPERATURE: 96.3 F

## 2023-05-16 DIAGNOSIS — R07.89 OTHER CHEST PAIN: ICD-10-CM

## 2023-05-16 DIAGNOSIS — R21 RASH AND OTHER NONSPECIFIC SKIN ERUPTION: ICD-10-CM

## 2023-05-16 DIAGNOSIS — Z78.9 OTHER SPECIFIED HEALTH STATUS: ICD-10-CM

## 2023-05-16 PROCEDURE — 99204 OFFICE O/P NEW MOD 45 MIN: CPT | Mod: 25

## 2023-05-16 PROCEDURE — 93000 ELECTROCARDIOGRAM COMPLETE: CPT

## 2023-05-17 ENCOUNTER — APPOINTMENT (OUTPATIENT)
Dept: OBGYN | Facility: CLINIC | Age: 33
End: 2023-05-17
Payer: MEDICAID

## 2023-05-17 ENCOUNTER — LABORATORY RESULT (OUTPATIENT)
Age: 33
End: 2023-05-17

## 2023-05-17 VITALS
SYSTOLIC BLOOD PRESSURE: 94 MMHG | HEIGHT: 63 IN | DIASTOLIC BLOOD PRESSURE: 59 MMHG | WEIGHT: 171 LBS | BODY MASS INDEX: 30.3 KG/M2

## 2023-05-17 DIAGNOSIS — O02.1 MISSED ABORTION: ICD-10-CM

## 2023-05-17 DIAGNOSIS — N92.6 IRREGULAR MENSTRUATION, UNSPECIFIED: ICD-10-CM

## 2023-05-17 DIAGNOSIS — N76.0 ACUTE VAGINITIS: ICD-10-CM

## 2023-05-17 PROBLEM — Z78.9 NO FAMILY HISTORY OF CARDIAC DISEASE: Status: ACTIVE | Noted: 2023-05-17

## 2023-05-17 PROBLEM — R07.89 CHEST DISCOMFORT: Status: ACTIVE | Noted: 2023-03-22

## 2023-05-17 PROCEDURE — P9615: CPT

## 2023-05-17 PROCEDURE — 99214 OFFICE O/P EST MOD 30 MIN: CPT | Mod: 25

## 2023-05-17 PROCEDURE — 99395 PREV VISIT EST AGE 18-39: CPT | Mod: 25

## 2023-05-17 NOTE — REVIEW OF SYSTEMS
[Abn Vaginal bleeding] : abnormal vaginal bleeding [Genital Rash/Irritation] : genital rash/irritation [Negative] : Heme/Lymph

## 2023-05-17 NOTE — HISTORY OF PRESENT ILLNESS
[FreeTextEntry1] : 32 year old Kyrgyz-speaking female with no pertinent cardiac history who presents for initial evaluation of palpitations and chest discomfort.  Patient reports that she started to have palpitations about 5 months ago when she was pregnant (#6), which lasted for a few seconds at a time.  They have improved after her pregnancy, and she has not had any episodes for the past 2 months.  Patient has never lost consciousness. No issues with prior pregnancies. \par \par She does mention that about 2 to 3 months ago she started having some chest discomfort, with recurrence as recently as yesterday.  She reports that this is midsternal, nonradiating, sharp in nature, not related to activity, not associated with dyspnea. Denies LE edema, orthopnea, or PND.  She is able to ascend a flight of stairs without symptoms.\par

## 2023-05-17 NOTE — COUNSELING
[Nutrition/ Exercise/ Weight Management] : nutrition, exercise, weight management [Body Image] : body image [Vitamins/Supplements] : vitamins/supplements [Breast Self Exam] : breast self exam [Contraception/ Emergency Contraception/ Safe Sexual Practices] : contraception, emergency contraception, safe sexual practices [STD (testing, results, tx)] : STD (testing, results, tx)

## 2023-05-17 NOTE — HISTORY OF PRESENT ILLNESS
[Irregular Cycle Intervals] : are  irregular [Tubal Occlusion] : has had a tubal occlusion [Monogamous (Male Partner)] : is monogamous with a male partner [Y] : Positive pregnancy history [Irregular Menstrual Interval] : irregular menstrual interval [Currently Active] : currently active [Men] : men [Vaginal] : vaginal [No] : No [LMPDate] : 04/09/2023 [MensesFreq] : 30-40days [MensesLength] : 5 [de-identified] : BTL [PGxTotal] : 12 [[facility]] : 6 [PGHxPremature] : 0 [PGHxAbortions] : 6 [[facility]Living] : 6 [PGHxABInduced] : 1 [PGHxABSpont] : 5 [FreeTextEntry1] : NSVDx1, C/S x5

## 2023-05-17 NOTE — PHYSICAL EXAM
[Chaperone Present] : A chaperone was present in the examining room during all aspects of the physical examination [Appropriately responsive] : appropriately responsive [Alert] : alert [No Acute Distress] : no acute distress [No Lymphadenopathy] : no lymphadenopathy [Soft] : soft [Non-tender] : non-tender [Non-distended] : non-distended [No HSM] : No HSM [No Lesions] : no lesions [No Mass] : no mass [Oriented x3] : oriented x3 [Examination Of The Breasts] : a normal appearance [No Masses] : no breast masses were palpable [Labia Majora] : normal [Labia Minora] : normal [Discharge] : discharge [Moderate] : moderate [White] : white [Thick] : thick [Normal] : normal [Uterine Adnexae] : normal [FreeTextEntry1] : Gabriela

## 2023-05-17 NOTE — ASSESSMENT
[FreeTextEntry1] : 32 year old Danish-speaking female with no pertinent cardiac history who presents for initial evaluation of palpitations and chest discomfort. \par \par 1.  Palpitations: Symptoms seem to have improved at this point.  No concerning findings of syncope \par -Will send patient for echocardiogram to rule out structural heart disease.\par -Will also send patient for treadmill EKG stress test to see if an arrhythmia is provoked with exertion\par \par 2.  Chest discomfort: Patient has atypical symptoms and no significant risk factors; she is therefore at low pretest probability of having CAD\par – As such, will send patient for plain EKG treadmill stress test for ischemic stratification and formal evaluation of her functional capacity\par \par 3.  Abnormal EKG: May be related to patient's body habitus/repositioning, will exclude structural heart defects with echocardiogram\par \par Will call patient with results of testing at [phone], OK to leave voicemail.\par

## 2023-05-17 NOTE — PLAN
[FreeTextEntry1] : 32 year  y/o P6 presenting for annual exam\par -f/u pap and GC/CT\par -bilateral breast exam \par -Contraception: BTL \par - dysuria: UAC sent \par -BD affirm sent - tx based on results \par - Clotrimazole - betamethasone cream RX sent  for outer vagina\par -irregular menses : AUB panel sent \par -f/u PRN\par

## 2023-05-18 LAB
ALBUMIN SERPL ELPH-MCNC: 5.1 G/DL
ALP BLD-CCNC: 97 U/L
ALT SERPL-CCNC: 18 U/L
ANION GAP SERPL CALC-SCNC: 20 MMOL/L
AST SERPL-CCNC: 21 U/L
BASOPHILS # BLD AUTO: 0.02 K/UL
BASOPHILS NFR BLD AUTO: 0.3 %
BILIRUB SERPL-MCNC: 0.6 MG/DL
BUN SERPL-MCNC: 14 MG/DL
CALCIUM SERPL-MCNC: 10 MG/DL
CHLORIDE SERPL-SCNC: 99 MMOL/L
CO2 SERPL-SCNC: 24 MMOL/L
CREAT SERPL-MCNC: 0.67 MG/DL
DHEA-S SERPL-MCNC: 157 UG/DL
EGFR: 118 ML/MIN/1.73M2
EOSINOPHIL # BLD AUTO: 0.23 K/UL
EOSINOPHIL NFR BLD AUTO: 3.5 %
ESTIMATED AVERAGE GLUCOSE: 111 MG/DL
FSH SERPL-MCNC: 1.8 IU/L
GLUCOSE SERPL-MCNC: 26 MG/DL
HBA1C MFR BLD HPLC: 5.5 %
HCT VFR BLD CALC: 44.9 %
HGB BLD-MCNC: 13.9 G/DL
IMM GRANULOCYTES NFR BLD AUTO: 0 %
LH SERPL-ACNC: 2.1 IU/L
LYMPHOCYTES # BLD AUTO: 2.2 K/UL
LYMPHOCYTES NFR BLD AUTO: 33 %
MAN DIFF?: NORMAL
MCHC RBC-ENTMCNC: 30 PG
MCHC RBC-ENTMCNC: 31 GM/DL
MCV RBC AUTO: 96.8 FL
MONOCYTES # BLD AUTO: 0.3 K/UL
MONOCYTES NFR BLD AUTO: 4.5 %
NEUTROPHILS # BLD AUTO: 3.91 K/UL
NEUTROPHILS NFR BLD AUTO: 58.7 %
PLATELET # BLD AUTO: 201 K/UL
POTASSIUM SERPL-SCNC: 3.7 MMOL/L
PROLACTIN SERPL-MCNC: 16.8 NG/ML
PROT SERPL-MCNC: 7.8 G/DL
RBC # BLD: 4.64 M/UL
RBC # FLD: 14.6 %
SODIUM SERPL-SCNC: 143 MMOL/L
T4 FREE SERPL-MCNC: 1.2 NG/DL
T4 SERPL-MCNC: 6.7 UG/DL
TESTOST SERPL-MCNC: 16.6 NG/DL
TSH SERPL-ACNC: 1.57 UIU/ML
WBC # FLD AUTO: 6.66 K/UL

## 2023-05-19 LAB
APPEARANCE: CLEAR
BILIRUBIN URINE: NEGATIVE
BLOOD URINE: NEGATIVE
C TRACH RRNA SPEC QL NAA+PROBE: NOT DETECTED
CANDIDA VAG CYTO: NOT DETECTED
COLOR: YELLOW
G VAGINALIS+PREV SP MTYP VAG QL MICRO: NOT DETECTED
GLUCOSE QUALITATIVE U: NEGATIVE MG/DL
HPV 16 E6+E7 MRNA CVX QL NAA+PROBE: NOT DETECTED
HPV HIGH+LOW RISK DNA PNL CVX: NOT DETECTED
HPV18+45 E6+E7 MRNA CVX QL NAA+PROBE: NOT DETECTED
KETONES URINE: NEGATIVE MG/DL
LEUKOCYTE ESTERASE URINE: NEGATIVE
N GONORRHOEA RRNA SPEC QL NAA+PROBE: NOT DETECTED
NITRITE URINE: NEGATIVE
PH URINE: 6
PROTEIN URINE: NEGATIVE MG/DL
SOURCE AMPLIFICATION: NORMAL
SPECIFIC GRAVITY URINE: 1.02
T VAGINALIS VAG QL WET PREP: NOT DETECTED
UROBILINOGEN URINE: 0.2 MG/DL

## 2023-05-21 LAB
BACTERIA UR CULT: NORMAL
CYTOLOGY CVX/VAG DOC THIN PREP: NORMAL

## 2023-06-12 ENCOUNTER — OUTPATIENT (OUTPATIENT)
Dept: OUTPATIENT SERVICES | Facility: HOSPITAL | Age: 33
LOS: 1 days | End: 2023-06-12
Payer: MEDICAID

## 2023-06-12 DIAGNOSIS — Z98.891 HISTORY OF UTERINE SCAR FROM PREVIOUS SURGERY: Chronic | ICD-10-CM

## 2023-06-12 DIAGNOSIS — Z98.890 OTHER SPECIFIED POSTPROCEDURAL STATES: Chronic | ICD-10-CM

## 2023-06-12 DIAGNOSIS — R07.89 OTHER CHEST PAIN: ICD-10-CM

## 2023-06-12 DIAGNOSIS — R00.2 PALPITATIONS: ICD-10-CM

## 2023-06-12 DIAGNOSIS — Z90.49 ACQUIRED ABSENCE OF OTHER SPECIFIED PARTS OF DIGESTIVE TRACT: Chronic | ICD-10-CM

## 2023-06-12 PROCEDURE — 93018 CV STRESS TEST I&R ONLY: CPT

## 2023-06-12 PROCEDURE — 93016 CV STRESS TEST SUPVJ ONLY: CPT

## 2023-06-13 ENCOUNTER — NON-APPOINTMENT (OUTPATIENT)
Age: 33
End: 2023-06-13

## 2023-06-27 ENCOUNTER — OUTPATIENT (OUTPATIENT)
Dept: OUTPATIENT SERVICES | Facility: HOSPITAL | Age: 33
LOS: 1 days | End: 2023-06-27
Payer: MEDICAID

## 2023-06-27 DIAGNOSIS — Z98.891 HISTORY OF UTERINE SCAR FROM PREVIOUS SURGERY: Chronic | ICD-10-CM

## 2023-06-27 DIAGNOSIS — Z98.890 OTHER SPECIFIED POSTPROCEDURAL STATES: Chronic | ICD-10-CM

## 2023-06-27 DIAGNOSIS — R00.2 PALPITATIONS: ICD-10-CM

## 2023-06-27 DIAGNOSIS — Z90.49 ACQUIRED ABSENCE OF OTHER SPECIFIED PARTS OF DIGESTIVE TRACT: Chronic | ICD-10-CM

## 2023-06-27 PROCEDURE — 93306 TTE W/DOPPLER COMPLETE: CPT | Mod: 26

## 2023-06-27 PROCEDURE — 93017 CV STRESS TEST TRACING ONLY: CPT

## 2023-06-27 PROCEDURE — 93306 TTE W/DOPPLER COMPLETE: CPT

## 2023-08-25 NOTE — OB RN PATIENT PROFILE - [id number]PN_OBGYN_ALL_OB
VTAMA Counseling: I discussed with the patient that VTAMA is not for use in the eyes, mouth or mouth. They should call the office if they develop any signs of allergic reactions to VTAMA. The patient verbalized understanding of the proper use and possible adverse effects of VTAMA.  All of the patient's questions and concerns were addressed. unknown

## 2024-02-10 LAB
APPEARANCE: CLEAR
BACTERIA: ABNORMAL /HPF
BILIRUBIN URINE: NEGATIVE
BLOOD URINE: NEGATIVE
CAST: 0 /LPF
COLOR: YELLOW
EPITHELIAL CELLS: 7 /HPF
GLUCOSE QUALITATIVE U: NEGATIVE MG/DL
KETONES URINE: NEGATIVE MG/DL
LEUKOCYTE ESTERASE URINE: ABNORMAL
MICROSCOPIC-UA: NORMAL
NITRITE URINE: NEGATIVE
PH URINE: 6
PROTEIN URINE: NEGATIVE MG/DL
RED BLOOD CELLS URINE: 2 /HPF
SPECIFIC GRAVITY URINE: 1.02
UROBILINOGEN URINE: 1 MG/DL
WHITE BLOOD CELLS URINE: 6 /HPF

## 2024-02-13 LAB — BACTERIA UR CULT: ABNORMAL

## 2024-03-11 ENCOUNTER — APPOINTMENT (OUTPATIENT)
Dept: OBGYN | Facility: CLINIC | Age: 34
End: 2024-03-11

## 2024-05-22 ENCOUNTER — APPOINTMENT (OUTPATIENT)
Dept: OBGYN | Facility: CLINIC | Age: 34
End: 2024-05-22

## 2024-05-23 ENCOUNTER — APPOINTMENT (OUTPATIENT)
Dept: OBGYN | Facility: CLINIC | Age: 34
End: 2024-05-23

## 2024-06-18 ENCOUNTER — APPOINTMENT (OUTPATIENT)
Dept: OBGYN | Facility: CLINIC | Age: 34
End: 2024-06-18
Payer: COMMERCIAL

## 2024-06-18 VITALS
WEIGHT: 166 LBS | SYSTOLIC BLOOD PRESSURE: 118 MMHG | BODY MASS INDEX: 29.41 KG/M2 | DIASTOLIC BLOOD PRESSURE: 80 MMHG | HEIGHT: 63 IN

## 2024-06-18 DIAGNOSIS — Z3A.36 36 WEEKS GESTATION OF PREGNANCY: ICD-10-CM

## 2024-06-18 DIAGNOSIS — Z87.898 PERSONAL HISTORY OF OTHER SPECIFIED CONDITIONS: ICD-10-CM

## 2024-06-18 DIAGNOSIS — Z3A.28 28 WEEKS GESTATION OF PREGNANCY: ICD-10-CM

## 2024-06-18 DIAGNOSIS — Z01.419 ENCOUNTER FOR GYNECOLOGICAL EXAMINATION (GENERAL) (ROUTINE) W/OUT ABNORMAL FINDINGS: ICD-10-CM

## 2024-06-18 DIAGNOSIS — Z33.1 PREGNANT STATE, INCIDENTAL: ICD-10-CM

## 2024-06-18 PROCEDURE — 99395 PREV VISIT EST AGE 18-39: CPT

## 2024-06-18 PROCEDURE — 99459 PELVIC EXAMINATION: CPT

## 2024-06-18 RX ORDER — TERCONAZOLE 4 MG/G
0.4 CREAM VAGINAL
Qty: 1 | Refills: 0 | Status: DISCONTINUED | COMMUNITY
Start: 2022-08-04 | End: 2024-06-18

## 2024-06-18 RX ORDER — METRONIDAZOLE 7.5 MG/G
0.75 GEL VAGINAL
Qty: 1 | Refills: 0 | Status: DISCONTINUED | COMMUNITY
Start: 2022-07-28 | End: 2024-06-18

## 2024-06-18 RX ORDER — CETIRIZINE HYDROCHLORIDE 10 MG/1
10 TABLET, COATED ORAL DAILY
Qty: 30 | Refills: 0 | Status: DISCONTINUED | COMMUNITY
Start: 2022-09-28 | End: 2024-06-18

## 2024-06-18 RX ORDER — METRONIDAZOLE 500 MG/1
500 TABLET ORAL
Qty: 14 | Refills: 0 | Status: DISCONTINUED | COMMUNITY
Start: 2022-11-17 | End: 2024-06-18

## 2024-06-18 RX ORDER — MULTIVITAMIN
TABLET ORAL
Refills: 0 | Status: ACTIVE | COMMUNITY

## 2024-06-18 RX ORDER — CEPHALEXIN 500 MG/1
500 CAPSULE ORAL 3 TIMES DAILY
Qty: 21 | Refills: 0 | Status: DISCONTINUED | COMMUNITY
Start: 2023-03-06 | End: 2024-06-18

## 2024-06-18 RX ORDER — CLOTRIMAZOLE AND BETAMETHASONE DIPROPIONATE 10; .5 MG/G; MG/G
1-0.05 CREAM TOPICAL TWICE DAILY
Qty: 1 | Refills: 0 | Status: DISCONTINUED | COMMUNITY
Start: 2022-08-04 | End: 2024-06-18

## 2024-06-18 RX ORDER — CAMPHOR 0.45 %
25 GEL (GRAM) TOPICAL
Qty: 14 | Refills: 0 | Status: DISCONTINUED | COMMUNITY
Start: 2022-09-28 | End: 2024-06-18

## 2024-06-18 RX ORDER — CLOTRIMAZOLE AND BETAMETHASONE DIPROPIONATE 10; .5 MG/G; MG/G
1-0.05 CREAM TOPICAL TWICE DAILY
Qty: 1 | Refills: 0 | Status: DISCONTINUED | COMMUNITY
Start: 2022-08-30 | End: 2024-06-18

## 2024-06-18 RX ORDER — MICONAZOLE NITRATE 2 G/100G
2 CREAM TOPICAL TWICE DAILY
Qty: 1 | Refills: 0 | Status: DISCONTINUED | COMMUNITY
Start: 2022-09-01 | End: 2024-06-18

## 2024-06-18 RX ORDER — HYDROCORTISONE 25 MG/G
2.5 CREAM TOPICAL
Qty: 1 | Refills: 0 | Status: DISCONTINUED | COMMUNITY
Start: 2022-09-28 | End: 2024-06-18

## 2024-06-18 RX ORDER — CLOTRIMAZOLE AND BETAMETHASONE DIPROPIONATE 10; .5 MG/G; MG/G
1-0.05 CREAM TOPICAL TWICE DAILY
Qty: 1 | Refills: 1 | Status: DISCONTINUED | COMMUNITY
Start: 2023-05-17 | End: 2024-06-18

## 2024-06-18 RX ORDER — TERCONAZOLE 4 MG/G
0.4 CREAM VAGINAL
Qty: 1 | Refills: 0 | Status: DISCONTINUED | COMMUNITY
Start: 2022-07-08 | End: 2024-06-18

## 2024-06-18 RX ORDER — NITROFURANTOIN (MONOHYDRATE/MACROCRYSTALS) 25; 75 MG/1; MG/1
100 CAPSULE ORAL
Qty: 14 | Refills: 0 | Status: DISCONTINUED | COMMUNITY
Start: 2024-02-12 | End: 2024-06-18

## 2024-06-18 NOTE — HISTORY OF PRESENT ILLNESS
[Y] : Positive pregnancy history [PGxTotal] : 10 [[facility]] : 6 [PGHxPremature] : 0 [PGHxAbortions] : 2 [[facility]] : 6 [PGHxABInduced] : 0 [PGHxABSpont] : 2 [PGHxEctopic] : 0 [PGHxMultBirths] : 0 [Regular Cycle Intervals] : periods have been regular [FreeTextEntry1] : 06/03/24 [No] : Patient does not have concerns regarding sex [Currently Active] : currently active [Men] : men [Vaginal] : vaginal [Patient refuses STI testing] : Patient refuses STI testing [FreeTextEntry3] : btl

## 2024-06-18 NOTE — PHYSICAL EXAM
[Chaperone Present] : A chaperone was present in the examining room during all aspects of the physical examination [[id number]] : A chaperone was present during the pelvic exam. [Appropriately responsive] : appropriately responsive [Alert] : alert [No Acute Distress] : no acute distress [Soft] : soft [Non-tender] : non-tender [Non-distended] : non-distended [No HSM] : No HSM [No Lesions] : no lesions [No Mass] : no mass [Oriented x3] : oriented x3 [Examination Of The Breasts] : a normal appearance [No Masses] : no breast masses were palpable [Labia Majora] : normal [Labia Minora] : normal [Normal] : normal [Uterine Adnexae] : normal

## 2024-06-18 NOTE — PLAN
[FreeTextEntry1] : 32 y/o female presenting for annual exam: -f/u pap and GC/CT -Contraception: BTL -f/u PRN

## 2024-06-19 LAB
C TRACH RRNA SPEC QL NAA+PROBE: NOT DETECTED
HPV 16 E6+E7 MRNA CVX QL NAA+PROBE: NOT DETECTED
HPV HIGH+LOW RISK DNA PNL CVX: NOT DETECTED
HPV HIGH+LOW RISK DNA PNL CVX: NOT DETECTED
HPV18+45 E6+E7 MRNA CVX QL NAA+PROBE: NOT DETECTED
N GONORRHOEA RRNA SPEC QL NAA+PROBE: NOT DETECTED
SOURCE AMPLIFICATION: NORMAL

## 2024-06-24 LAB — CYTOLOGY CVX/VAG DOC THIN PREP: NORMAL

## 2024-09-13 ENCOUNTER — APPOINTMENT (OUTPATIENT)
Dept: INTERNAL MEDICINE | Facility: CLINIC | Age: 34
End: 2024-09-13

## 2024-09-25 ENCOUNTER — APPOINTMENT (OUTPATIENT)
Dept: INTERNAL MEDICINE | Facility: CLINIC | Age: 34
End: 2024-09-25

## 2025-06-06 ENCOUNTER — APPOINTMENT (OUTPATIENT)
Dept: OBGYN | Facility: CLINIC | Age: 35
End: 2025-06-06

## 2025-08-13 ENCOUNTER — APPOINTMENT (OUTPATIENT)
Dept: OBGYN | Facility: CLINIC | Age: 35
End: 2025-08-13

## (undated) DEVICE — LIGASURE IMPACT

## (undated) DEVICE — DRSG DERMABOND PRINEO 22CM